# Patient Record
Sex: MALE | Race: WHITE | NOT HISPANIC OR LATINO | URBAN - METROPOLITAN AREA
[De-identification: names, ages, dates, MRNs, and addresses within clinical notes are randomized per-mention and may not be internally consistent; named-entity substitution may affect disease eponyms.]

---

## 2017-12-28 ENCOUNTER — IMPORTED ENCOUNTER (OUTPATIENT)
Dept: URBAN - METROPOLITAN AREA CLINIC 43 | Facility: CLINIC | Age: 77
End: 2017-12-28

## 2017-12-28 PROBLEM — H25.13: Noted: 2017-12-28

## 2018-02-15 ENCOUNTER — IMPORTED ENCOUNTER (OUTPATIENT)
Dept: URBAN - METROPOLITAN AREA CLINIC 43 | Facility: CLINIC | Age: 78
End: 2018-02-15

## 2018-02-15 PROBLEM — H43.813: Noted: 2018-02-15

## 2018-02-15 PROBLEM — H25.13: Noted: 2018-02-15

## 2018-03-21 ENCOUNTER — IMPORTED ENCOUNTER (OUTPATIENT)
Dept: URBAN - METROPOLITAN AREA CLINIC 43 | Facility: CLINIC | Age: 78
End: 2018-03-21

## 2018-03-21 PROBLEM — Z96.1: Noted: 2018-03-21

## 2018-03-26 ENCOUNTER — IMPORTED ENCOUNTER (OUTPATIENT)
Dept: URBAN - METROPOLITAN AREA CLINIC 43 | Facility: CLINIC | Age: 78
End: 2018-03-26

## 2018-03-26 PROBLEM — Z96.1: Noted: 2018-03-26

## 2018-03-26 PROBLEM — H25.11: Noted: 2018-03-26

## 2018-04-04 ENCOUNTER — IMPORTED ENCOUNTER (OUTPATIENT)
Dept: URBAN - METROPOLITAN AREA CLINIC 43 | Facility: CLINIC | Age: 78
End: 2018-04-04

## 2018-04-04 PROBLEM — Z96.1: Noted: 2018-04-04

## 2018-04-06 ENCOUNTER — IMPORTED ENCOUNTER (OUTPATIENT)
Dept: URBAN - METROPOLITAN AREA CLINIC 43 | Facility: CLINIC | Age: 78
End: 2018-04-06

## 2018-04-06 PROBLEM — H02.051: Noted: 2018-04-06

## 2018-04-06 PROBLEM — Z96.1: Noted: 2018-04-06

## 2018-04-19 ENCOUNTER — IMPORTED ENCOUNTER (OUTPATIENT)
Dept: URBAN - METROPOLITAN AREA CLINIC 43 | Facility: CLINIC | Age: 78
End: 2018-04-19

## 2018-04-19 PROBLEM — Z96.1: Noted: 2018-04-19

## 2018-04-19 PROBLEM — H43.813: Noted: 2018-04-19

## 2018-04-25 ENCOUNTER — IMPORTED ENCOUNTER (OUTPATIENT)
Dept: URBAN - METROPOLITAN AREA CLINIC 43 | Facility: CLINIC | Age: 78
End: 2018-04-25

## 2018-04-25 PROBLEM — Z96.1: Noted: 2018-04-25

## 2018-05-01 ENCOUNTER — IMPORTED ENCOUNTER (OUTPATIENT)
Dept: URBAN - METROPOLITAN AREA CLINIC 43 | Facility: CLINIC | Age: 78
End: 2018-05-01

## 2018-05-01 PROBLEM — Z96.1: Noted: 2018-05-01

## 2018-05-24 ENCOUNTER — IMPORTED ENCOUNTER (OUTPATIENT)
Dept: URBAN - METROPOLITAN AREA CLINIC 43 | Facility: CLINIC | Age: 78
End: 2018-05-24

## 2018-05-24 PROBLEM — H43.813: Noted: 2018-05-24

## 2018-05-24 PROBLEM — Z96.1: Noted: 2018-05-24

## 2019-01-07 ENCOUNTER — PREPPED CHART (OUTPATIENT)
Dept: URBAN - METROPOLITAN AREA CLINIC 32 | Facility: CLINIC | Age: 79
End: 2019-01-07

## 2019-01-07 ENCOUNTER — IMPORTED ENCOUNTER (OUTPATIENT)
Dept: URBAN - METROPOLITAN AREA CLINIC 43 | Facility: CLINIC | Age: 79
End: 2019-01-07

## 2019-01-18 ENCOUNTER — IMPORTED ENCOUNTER (OUTPATIENT)
Dept: URBAN - METROPOLITAN AREA CLINIC 43 | Facility: CLINIC | Age: 79
End: 2019-01-18

## 2020-04-19 ASSESSMENT — TONOMETRY
OS_IOP_MMHG: 11.0
OD_IOP_MMHG: 12.0
OD_IOP_MMHG: 16.0
OD_IOP_MMHG: 15.0
OS_IOP_MMHG: 18.0
OS_IOP_MMHG: 13.0
OS_IOP_MMHG: 12.0
OS_IOP_MMHG: 11.0
OS_IOP_MMHG: 16.0
OD_IOP_MMHG: 12.0
OD_IOP_MMHG: 13.0
OS_IOP_MMHG: 10.0
OD_IOP_MMHG: 13.0
OD_IOP_MMHG: 12.0

## 2020-04-19 ASSESSMENT — KERATOMETRY
OD_AXISANGLE_DEGREES: 65
OS_K1POWER_DIOPTERS: 43.5
OD_K1POWER_DIOPTERS: 44
OS_K2POWER_DIOPTERS: 44.25
OS_K2POWER_DIOPTERS: 43
OD_AXISANGLE2_DEGREES: 175
OD_AXISANGLE2_DEGREES: 155
OS_K1POWER_DIOPTERS: 44.25
OD_K1POWER_DIOPTERS: 44
OD_K2POWER_DIOPTERS: 43.5
OS_AXISANGLE2_DEGREES: 170
OD_AXISANGLE_DEGREES: 85
OD_AXISANGLE_DEGREES: 55
OS_AXISANGLE2_DEGREES: 180
OS_AXISANGLE_DEGREES: 70
OD_K2POWER_DIOPTERS: 43
OD_K1POWER_DIOPTERS: 44.25
OS_AXISANGLE_DEGREES: 170
OS_AXISANGLE2_DEGREES: 80
OS_K1POWER_DIOPTERS: 43.5
OS_K2POWER_DIOPTERS: 43
OD_K2POWER_DIOPTERS: 44.5
OS_K2POWER_DIOPTERS: 43.5
OS_AXISANGLE_DEGREES: 80
OS_AXISANGLE2_DEGREES: 160
OS_K1POWER_DIOPTERS: 44
OD_AXISANGLE2_DEGREES: 145
OS_AXISANGLE_DEGREES: 90

## 2020-04-19 ASSESSMENT — VISUAL ACUITY
OD_OTHER: 20/200.
OD_SC: 20/60-1
OS_OTHER: <20/400.
OS_SC: J1
OS_OTHER: <20/400.
OS_CC: J1+
OD_OTHER: <20/400.
OS_SC: 20/60+
OS_SC: 20/30
OS_CC: J1
OD_SC: 20/80
OD_CC: J2
OD_CC: J1
OD_SC: 20/200
OS_SC: 20/40+1
OS_SC: 20/40
OD_SC: 20/70
OD_SC: 20/80
OS_CC: 20/25
OD_SC: 20/40+1
OD_SC: 20/40
OS_SC: J1
OS_PH: 20/50
OS_SC: 20/50
OD_CC: 20/40 +1
OS_SC: 20/30
OS_PH: 20/25-2
OD_SC: 20/50
OS_CC: 20/25
OD_SC: J1+
OD_CC: 20/25-
OS_SC: J10
OD_PH: 20/30-1
OD_OTHER: <20/400.
OS_SC: 20/40+/-
OS_SC: J10
OD_SC: J1-
OD_CC: 20/40
OS_SC: 20/50
OD_OTHER: <20/400.
OD_CC: J3
OD_CC: 20/25
OD_SC: J1
OD_CC: 20/50+1
OS_SC: 20/60
OS_SC: 20/20
OD_SC: 20/100 -1

## 2020-10-27 NOTE — PATIENT DISCUSSION
Explained ELROY and handout given. Recommended regular use of ATs 2-4 times per day and prn. Advised Refresh Relieva is safe for use with and without CL; sample given today.

## 2021-11-02 NOTE — PATIENT DISCUSSION
Advised Pt of condition of cataracts and option of CE to improve visual function. All questions answered and procedure discussed. Pt will consider option and call if decides to proceed.

## 2021-11-02 NOTE — PATIENT DISCUSSION
Explained ELROY and handout given. Recommended regular use of ATs 2-4 times per day and prn. Advised Refresh Relieva is safe for use with and without CL.

## 2021-11-09 NOTE — PATIENT DISCUSSION
Advised Pt of condition of cataracts and option of CE to improve visual function. All questions answered and procedure discussed. Pt will consider MF v Mono IOL and call if decides to proceed.

## 2021-11-09 NOTE — PATIENT DISCUSSION
SCL trials given BF refit and order 2nd pair to order. Pt to try both and confirm which she prefers.

## 2022-02-18 ENCOUNTER — RX ONLY (RX ONLY)
Age: 82
End: 2022-02-18

## 2022-09-20 NOTE — PATIENT DISCUSSION
Patient/Caregiver provided printed discharge information. RTC in 1 year for CL Exam, sooner if problems or changes.

## 2022-09-21 NOTE — PATIENT DISCUSSION
Improving today better signs and symptoms, remove CL tomorrow if healed. Advised to call immediately if worsening eye pain or loss of vision.

## 2023-02-18 ENCOUNTER — RX ONLY (RX ONLY)
Age: 83
End: 2023-02-18

## 2025-02-19 ENCOUNTER — APPOINTMENT (OUTPATIENT)
Dept: URBAN - METROPOLITAN AREA CLINIC 124 | Facility: CLINIC | Age: 85
Setting detail: DERMATOLOGY
End: 2025-02-19

## 2025-02-19 DIAGNOSIS — L82.1 OTHER SEBORRHEIC KERATOSIS: ICD-10-CM

## 2025-02-19 DIAGNOSIS — D17 BENIGN LIPOMATOUS NEOPLASM: ICD-10-CM

## 2025-02-19 DIAGNOSIS — D69.2 OTHER NONTHROMBOCYTOPENIC PURPURA: ICD-10-CM

## 2025-02-19 DIAGNOSIS — Z85.828 PERSONAL HISTORY OF OTHER MALIGNANT NEOPLASM OF SKIN: ICD-10-CM

## 2025-02-19 DIAGNOSIS — L57.8 OTHER SKIN CHANGES DUE TO CHRONIC EXPOSURE TO NONIONIZING RADIATION: ICD-10-CM

## 2025-02-19 DIAGNOSIS — L57.0 ACTINIC KERATOSIS: ICD-10-CM

## 2025-02-19 PROBLEM — D17.1 BENIGN LIPOMATOUS NEOPLASM OF SKIN AND SUBCUTANEOUS TISSUE OF TRUNK: Status: ACTIVE | Noted: 2025-02-19

## 2025-02-19 PROCEDURE — 17000 DESTRUCT PREMALG LESION: CPT

## 2025-02-19 PROCEDURE — 17003 DESTRUCT PREMALG LES 2-14: CPT

## 2025-02-19 PROCEDURE — ? LIQUID NITROGEN

## 2025-02-19 PROCEDURE — 99203 OFFICE O/P NEW LOW 30 MIN: CPT | Mod: 25

## 2025-02-19 PROCEDURE — ? COUNSELING

## 2025-02-19 ASSESSMENT — LOCATION SIMPLE DESCRIPTION DERM
LOCATION SIMPLE: LEFT EAR
LOCATION SIMPLE: RIGHT HAND
LOCATION SIMPLE: RIGHT FOREARM
LOCATION SIMPLE: RIGHT CHEEK
LOCATION SIMPLE: NOSE
LOCATION SIMPLE: RIGHT EAR
LOCATION SIMPLE: RIGHT UPPER BACK
LOCATION SIMPLE: NECK
LOCATION SIMPLE: LEFT HAND
LOCATION SIMPLE: LEFT FOREARM
LOCATION SIMPLE: LEFT TEMPLE
LOCATION SIMPLE: CHEST
LOCATION SIMPLE: LEFT UPPER BACK
LOCATION SIMPLE: LEFT MIDDLE FINGER

## 2025-02-19 ASSESSMENT — LOCATION DETAILED DESCRIPTION DERM
LOCATION DETAILED: STERNAL NOTCH
LOCATION DETAILED: RIGHT PROXIMAL DORSAL FOREARM
LOCATION DETAILED: RIGHT CENTRAL LATERAL NECK
LOCATION DETAILED: RIGHT RADIAL DORSAL HAND
LOCATION DETAILED: RIGHT SUPERIOR LATERAL MALAR CHEEK
LOCATION DETAILED: RIGHT DORSAL RING METACARPOPHALANGEAL JOINT
LOCATION DETAILED: LEFT DISTAL DORSAL FOREARM
LOCATION DETAILED: LEFT CENTRAL TEMPLE
LOCATION DETAILED: RIGHT SUPERIOR HELIX
LOCATION DETAILED: RIGHT SUPERIOR LATERAL NECK
LOCATION DETAILED: 2ND WEB SPACE LEFT HAND
LOCATION DETAILED: LEFT PROXIMAL DORSAL MIDDLE FINGER
LOCATION DETAILED: LEFT PROXIMAL DORSAL FOREARM
LOCATION DETAILED: RIGHT DISTAL DORSAL FOREARM
LOCATION DETAILED: LEFT RADIAL DORSAL HAND
LOCATION DETAILED: RIGHT INFERIOR LATERAL UPPER BACK
LOCATION DETAILED: LEFT SUPERIOR HELIX
LOCATION DETAILED: LEFT SUPERIOR LATERAL NECK
LOCATION DETAILED: LEFT MID TEMPLE
LOCATION DETAILED: LEFT ULNAR DORSAL HAND
LOCATION DETAILED: NASAL TIP
LOCATION DETAILED: STERNUM
LOCATION DETAILED: LEFT ANTIHELIX
LOCATION DETAILED: LEFT MID-UPPER BACK

## 2025-02-19 ASSESSMENT — LOCATION ZONE DERM
LOCATION ZONE: TRUNK
LOCATION ZONE: FACE
LOCATION ZONE: EAR
LOCATION ZONE: HAND
LOCATION ZONE: FINGER
LOCATION ZONE: ARM
LOCATION ZONE: NOSE
LOCATION ZONE: NECK

## 2025-02-19 NOTE — HPI: FULL BODY SKIN EXAMINATION
What Is The Reason For Today's Visit?: Full Body Skin Examination
What Is The Reason For Today's Visit? (Being Monitored For X): the development of a new lesion
Additional History: Patient has no spots of concern.

## 2025-02-19 NOTE — PROCEDURE: LIQUID NITROGEN
Total Number Of Aks Treated: 9
Render In Bullet Format When Appropriate: No
Duration Of Freeze Thaw-Cycle (Seconds): 0
Detail Level: Zone
Post-Care Instructions: I reviewed with the patient in detail post-care instructions. Patient is to wear sunprotection, and avoid picking at any of the treated lesions. Pt may apply Vaseline to crusted or scabbing areas.
Consent: The patient's consent was obtained including but not limited to risks of crusting, scabbing, blistering, scarring, darker or lighter pigmentary change, recurrence, incomplete removal and infection.